# Patient Record
Sex: FEMALE | HISPANIC OR LATINO | Employment: FULL TIME | URBAN - METROPOLITAN AREA
[De-identification: names, ages, dates, MRNs, and addresses within clinical notes are randomized per-mention and may not be internally consistent; named-entity substitution may affect disease eponyms.]

---

## 2024-08-30 ENCOUNTER — APPOINTMENT (OUTPATIENT)
Dept: RADIOLOGY | Facility: IMAGING CENTER | Age: 29
End: 2024-08-30
Attending: PHYSICIAN ASSISTANT
Payer: COMMERCIAL

## 2024-08-30 ENCOUNTER — OFFICE VISIT (OUTPATIENT)
Dept: URGENT CARE | Facility: PHYSICIAN GROUP | Age: 29
End: 2024-08-30
Payer: COMMERCIAL

## 2024-08-30 VITALS
WEIGHT: 159 LBS | SYSTOLIC BLOOD PRESSURE: 118 MMHG | BODY MASS INDEX: 28.17 KG/M2 | RESPIRATION RATE: 14 BRPM | DIASTOLIC BLOOD PRESSURE: 80 MMHG | HEART RATE: 79 BPM | HEIGHT: 63 IN | TEMPERATURE: 97.8 F | OXYGEN SATURATION: 95 %

## 2024-08-30 DIAGNOSIS — R14.0 ABDOMINAL BLOATING: ICD-10-CM

## 2024-08-30 DIAGNOSIS — K59.00 CONSTIPATION, UNSPECIFIED CONSTIPATION TYPE: ICD-10-CM

## 2024-08-30 PROCEDURE — 74019 RADEX ABDOMEN 2 VIEWS: CPT | Mod: TC | Performed by: PHYSICIAN ASSISTANT

## 2024-08-30 ASSESSMENT — ENCOUNTER SYMPTOMS
CHANGE IN BOWEL HABIT: 1
EYE REDNESS: 0
EYE DISCHARGE: 0
NAUSEA: 1
COUGH: 0
ABDOMINAL PAIN: 1
MYALGIAS: 0
DIARRHEA: 0
VOMITING: 0
HEADACHES: 0
FEVER: 0
CONSTIPATION: 1

## 2024-08-30 NOTE — PROGRESS NOTES
Subjective     Jodie Ko is a 29 y.o. female who presents with GI Problem (Woke up with belly pain yesterday, chills, unable to go yesterday, nausea, )            GI Problem  This is a new problem. The current episode started yesterday. The problem occurs constantly. The problem has been unchanged. Associated symptoms include abdominal pain (The patient reports intermittent abdominal discomfort with associated bloating.  The patient states her abdominal discomfort is worse after eating.), a change in bowel habit (The patient states she is experiencing constipation.  Patient reports an urge to have abdominal pain, but states she is unable to.  The patient reports no diarrhea.  She also reports no bloody stools.) and nausea. Pertinent negatives include no congestion, coughing, fever, headaches, myalgias or vomiting. Treatments tried: OTC prune juice.     The patient reports no recent sick contacts.    PMH:  has a past medical history of Anemia (1/29/2014).    She has no past medical history of Addisons disease ([facility]), Adrenal disorder ([facility]), Allergy, Anxiety, Arrhythmia, Arthritis, ASTHMA, Blood transfusion, Cancer ([facility]), CATARACT, CHF (congestive heart failure) ([facility]), Clotting disorder ([facility]), COPD, Cushings syndrome ([facility]), Depression, Diabetes, Diabetic neuropathy ([facility]), EMPHYSEMA, GERD (gastroesophageal reflux disease), Glaucoma, Goiter, Headache(784.0), Heart attack ([facility]), Heart murmur, HIV (human immunodeficiency virus infection), Hyperlipidemia, Hypertension, IBD (inflammatory bowel disease), Kidney disease, Meningitis, Migraine, Muscle disorder, OSTEOPOROSIS, Parathyroid disorder ([facility]), Pituitary disease ([facility]), Seizure ([facility]), Sickle cell disease ([facility]), Stroke ([facility]), Substance abuse ([facility]), Thyroid disease, Tuberculosis, Ulcer, or Urinary tract infection, site not specified.  MEDS:   Current Outpatient Medications:     Prenatal MV-Min-Fe Fum-FA-DHA (PRENATAL 1 PO), Take  by mouth. (Patient not  "taking: Reported on 8/30/2024), Disp: , Rfl:   ALLERGIES:   Allergies   Allergen Reactions    Nkda [No Known Drug Allergy]      SURGHX: History reviewed. No pertinent surgical history.  SOCHX:  reports that she has never smoked. She has never used smokeless tobacco. She reports current alcohol use. She reports that she does not use drugs.  FH: Family history was reviewed, no pertinent findings to report      Review of Systems   Constitutional:  Negative for fever.   HENT:  Negative for congestion.    Eyes:  Negative for discharge and redness.   Respiratory:  Negative for cough.    Gastrointestinal:  Positive for abdominal pain (The patient reports intermittent abdominal discomfort with associated bloating.  The patient states her abdominal discomfort is worse after eating.), change in bowel habit (The patient states she is experiencing constipation.  Patient reports an urge to have abdominal pain, but states she is unable to.  The patient reports no diarrhea.  She also reports no bloody stools.), constipation and nausea. Negative for diarrhea and vomiting.   Musculoskeletal:  Negative for myalgias.   Neurological:  Negative for headaches.              Objective     /80 (BP Location: Right arm, Patient Position: Sitting, BP Cuff Size: Adult)   Pulse 79   Temp 36.6 °C (97.8 °F) (Temporal)   Resp 14   Ht 1.6 m (5' 3\")   Wt 72.1 kg (159 lb)   LMP 08/14/2024   SpO2 95%   BMI 28.17 kg/m²      Physical Exam  Constitutional:       General: She is not in acute distress.     Appearance: Normal appearance. She is well-developed. She is not ill-appearing.   HENT:      Head: Normocephalic and atraumatic.      Right Ear: External ear normal.      Left Ear: External ear normal.      Mouth/Throat:      Mouth: Mucous membranes are moist.      Pharynx: Oropharynx is clear. No posterior oropharyngeal erythema.   Eyes:      Extraocular Movements: Extraocular movements intact.      Conjunctiva/sclera: Conjunctivae normal. "   Cardiovascular:      Rate and Rhythm: Normal rate and regular rhythm.      Heart sounds: Normal heart sounds.   Pulmonary:      Effort: Pulmonary effort is normal. No respiratory distress.      Breath sounds: Normal breath sounds. No wheezing.   Abdominal:      General: Bowel sounds are normal.      Palpations: Abdomen is soft.      Tenderness: There is generalized abdominal tenderness. There is no guarding or rebound.   Musculoskeletal:         General: Normal range of motion.      Cervical back: Normal range of motion and neck supple.   Skin:     General: Skin is warm and dry.   Neurological:      Mental Status: She is alert and oriented to person, place, and time.                  Progress:  Abdominal XR:   COMPARISON: None     FINDINGS:  No free air under the diaphragm.  No dilated loops of bowel or air-fluid levels.  No significantly increased stool burden.  No suspicious calcifications.  No acute osseous abnormality.     IMPRESSION:  Normal.           Assessment & Plan        Assessment & Plan  Abdominal bloating    Orders:    [id number] VIEWS; Future    Constipation, unspecified constipation type    Orders:    [id number]; Future      The patient's presenting symptoms and physical exam findings are consistent with abdominal bloating with associated constipation.  On physical exam, the patient generalized abdominal tenderness without guarding or rebound.  The patient's bowel sounds are within normal limits.  The patient is nontoxic and appears in no acute distress.  The patient's vital signs are stable and within normal limits.  She is afebrile today in clinic.  An x-ray of the patient's abdomen was obtained today in clinic to further evaluate her current symptoms.  The patient's abdominal x-ray was normal.  Based on the patient's presenting symptoms and physical examinations, I followed up with patient for an acute abdominal process.  Advised the patient to monitor for worsening signs or symptoms.   Recommend OTC medications and supportive care for symptomatic management.  Recommend patient up with primary care as needed.  Discussed return precautions with the patient, and she verbalized understanding.    Differential diagnoses, supportive care, and indications for immediate follow-up discussed with patient.   Instructed to return to clinic or nearest emergency department for any change in condition, further concerns, or worsening of symptoms.    OTC Tylenol or Motrin for fever/discomfort.  OTC MiraLAX for symptomatic leaf of constipation  OTC Gas-X for symptomatic relief of bloating  Drink plenty of fluids  High-fiber diet  Monitor for worsening signs or symptoms  Follow-up with PCP  Return to clinic or go to the ED if symptoms worsen or fail to improve, or if the patient should develop worsening/increasing/persistent abdominal pain, abdominal bloating, nausea, vomiting, diarrhea, constipation, bloody stools, decreased p.o. intake, urinary symptoms, fever/chills, and/or any concerning symptoms.    Discussed plan with the patient, and she agrees to the above.     I personally reviewed prior external notes and test results pertinent to today's visit.  I have independently reviewed and interpreted all diagnostics ordered during this urgent care visit.     Please note that this dictation was created using voice recognition software. I have made every reasonable attempt to correct obvious errors, but I expect that there may be errors of grammar and possibly content that I did not discover before finalizing the note.     This note was electronically signed by Gaby Villegas PA-C

## 2024-08-30 NOTE — LETTER
August 30, 2024         Patient: Jodie Ko   YOB: 1995   Date of Visit: 8/30/2024           To Whom it May Concern:    Jodie Ko was seen in my clinic on 8/30/2024. Please excuse her from work 8/29 and 8/30.  She may return to work on 9/3/2024 as scheduled.    If you have any questions or concerns, please don't hesitate to call.        Sincerely,           Gaby Villegas P.A.-C.  Electronically Signed